# Patient Record
Sex: MALE | Race: OTHER | HISPANIC OR LATINO | URBAN - METROPOLITAN AREA
[De-identification: names, ages, dates, MRNs, and addresses within clinical notes are randomized per-mention and may not be internally consistent; named-entity substitution may affect disease eponyms.]

---

## 2023-02-21 ENCOUNTER — INPATIENT (INPATIENT)
Facility: HOSPITAL | Age: 28
LOS: 1 days | Discharge: HOME | DRG: 392 | End: 2023-02-23
Attending: INTERNAL MEDICINE | Admitting: INTERNAL MEDICINE
Payer: MEDICAID

## 2023-02-21 VITALS
RESPIRATION RATE: 18 BRPM | WEIGHT: 160.06 LBS | HEIGHT: 73 IN | HEART RATE: 96 BPM | TEMPERATURE: 100 F | OXYGEN SATURATION: 97 % | SYSTOLIC BLOOD PRESSURE: 111 MMHG | DIASTOLIC BLOOD PRESSURE: 64 MMHG

## 2023-02-21 DIAGNOSIS — A41.9 SEPSIS, UNSPECIFIED ORGANISM: ICD-10-CM

## 2023-02-21 DIAGNOSIS — Z29.9 ENCOUNTER FOR PROPHYLACTIC MEASURES, UNSPECIFIED: ICD-10-CM

## 2023-02-21 DIAGNOSIS — R65.10 SYSTEMIC INFLAMMATORY RESPONSE SYNDROME (SIRS) OF NON-INFECTIOUS ORIGIN WITHOUT ACUTE ORGAN DYSFUNCTION: ICD-10-CM

## 2023-02-21 LAB
ACETONE SERPL-MCNC: NEGATIVE — SIGNIFICANT CHANGE UP
ALBUMIN SERPL ELPH-MCNC: 3.8 G/DL — SIGNIFICANT CHANGE UP (ref 3.5–5)
ALP SERPL-CCNC: 80 U/L — SIGNIFICANT CHANGE UP (ref 40–120)
ALT FLD-CCNC: 21 U/L DA — SIGNIFICANT CHANGE UP (ref 10–60)
ANION GAP SERPL CALC-SCNC: 8 MMOL/L — SIGNIFICANT CHANGE UP (ref 5–17)
APPEARANCE UR: CLEAR — SIGNIFICANT CHANGE UP
APTT BLD: 27.1 SEC — LOW (ref 27.5–35.5)
AST SERPL-CCNC: 16 U/L — SIGNIFICANT CHANGE UP (ref 10–40)
BACTERIA # UR AUTO: ABNORMAL /HPF
BASOPHILS # BLD AUTO: 0 K/UL — SIGNIFICANT CHANGE UP (ref 0–0.2)
BASOPHILS NFR BLD AUTO: 0 % — SIGNIFICANT CHANGE UP (ref 0–2)
BILIRUB SERPL-MCNC: 1 MG/DL — SIGNIFICANT CHANGE UP (ref 0.2–1.2)
BILIRUB UR-MCNC: ABNORMAL
BUN SERPL-MCNC: 15 MG/DL — SIGNIFICANT CHANGE UP (ref 7–18)
CALCIUM SERPL-MCNC: 8.9 MG/DL — SIGNIFICANT CHANGE UP (ref 8.4–10.5)
CHLORIDE SERPL-SCNC: 110 MMOL/L — HIGH (ref 96–108)
CO2 SERPL-SCNC: 26 MMOL/L — SIGNIFICANT CHANGE UP (ref 22–31)
COLOR SPEC: YELLOW — SIGNIFICANT CHANGE UP
CREAT SERPL-MCNC: 1.11 MG/DL — SIGNIFICANT CHANGE UP (ref 0.5–1.3)
DIFF PNL FLD: ABNORMAL
EGFR: 94 ML/MIN/1.73M2 — SIGNIFICANT CHANGE UP
EOSINOPHIL # BLD AUTO: 0 K/UL — SIGNIFICANT CHANGE UP (ref 0–0.5)
EOSINOPHIL NFR BLD AUTO: 0 % — SIGNIFICANT CHANGE UP (ref 0–6)
EPI CELLS # UR: SIGNIFICANT CHANGE UP /HPF
GLUCOSE SERPL-MCNC: 125 MG/DL — HIGH (ref 70–99)
GLUCOSE UR QL: NEGATIVE — SIGNIFICANT CHANGE UP
HCT VFR BLD CALC: 48.8 % — SIGNIFICANT CHANGE UP (ref 39–50)
HGB BLD-MCNC: 16 G/DL — SIGNIFICANT CHANGE UP (ref 13–17)
INR BLD: 1.2 RATIO — HIGH (ref 0.88–1.16)
KETONES UR-MCNC: ABNORMAL
LACTATE SERPL-SCNC: 1.4 MMOL/L — SIGNIFICANT CHANGE UP (ref 0.7–2)
LEUKOCYTE ESTERASE UR-ACNC: ABNORMAL
LYMPHOCYTES # BLD AUTO: 0.58 K/UL — LOW (ref 1–3.3)
LYMPHOCYTES # BLD AUTO: 5 % — LOW (ref 13–44)
MANUAL SMEAR VERIFICATION: SIGNIFICANT CHANGE UP
MCHC RBC-ENTMCNC: 29.8 PG — SIGNIFICANT CHANGE UP (ref 27–34)
MCHC RBC-ENTMCNC: 32.8 GM/DL — SIGNIFICANT CHANGE UP (ref 32–36)
MCV RBC AUTO: 90.9 FL — SIGNIFICANT CHANGE UP (ref 80–100)
METAMYELOCYTES # FLD: 2 % — HIGH (ref 0–0)
MONOCYTES # BLD AUTO: 0.23 K/UL — SIGNIFICANT CHANGE UP (ref 0–0.9)
MONOCYTES NFR BLD AUTO: 2 % — SIGNIFICANT CHANGE UP (ref 2–14)
NEUTROPHILS # BLD AUTO: 10.48 K/UL — HIGH (ref 1.8–7.4)
NEUTROPHILS NFR BLD AUTO: 90 % — HIGH (ref 43–77)
NEUTS BAND # BLD: 1 % — SIGNIFICANT CHANGE UP (ref 0–8)
NITRITE UR-MCNC: NEGATIVE — SIGNIFICANT CHANGE UP
NRBC # BLD: 0 /100 — SIGNIFICANT CHANGE UP (ref 0–0)
PH UR: 6 — SIGNIFICANT CHANGE UP (ref 5–8)
PLAT MORPH BLD: NORMAL — SIGNIFICANT CHANGE UP
PLATELET # BLD AUTO: 192 K/UL — SIGNIFICANT CHANGE UP (ref 150–400)
PLATELET COUNT - ESTIMATE: NORMAL — SIGNIFICANT CHANGE UP
POTASSIUM SERPL-MCNC: 4.2 MMOL/L — SIGNIFICANT CHANGE UP (ref 3.5–5.3)
POTASSIUM SERPL-SCNC: 4.2 MMOL/L — SIGNIFICANT CHANGE UP (ref 3.5–5.3)
PROT SERPL-MCNC: 6.9 G/DL — SIGNIFICANT CHANGE UP (ref 6–8.3)
PROT UR-MCNC: 30 MG/DL
PROTHROM AB SERPL-ACNC: 14.3 SEC — HIGH (ref 10.5–13.4)
RAPID RVP RESULT: SIGNIFICANT CHANGE UP
RBC # BLD: 5.37 M/UL — SIGNIFICANT CHANGE UP (ref 4.2–5.8)
RBC # FLD: 11.6 % — SIGNIFICANT CHANGE UP (ref 10.3–14.5)
RBC BLD AUTO: NORMAL — SIGNIFICANT CHANGE UP
RBC CASTS # UR COMP ASSIST: ABNORMAL /HPF (ref 0–2)
SARS-COV-2 RNA SPEC QL NAA+PROBE: SIGNIFICANT CHANGE UP
SODIUM SERPL-SCNC: 144 MMOL/L — SIGNIFICANT CHANGE UP (ref 135–145)
SP GR SPEC: 1.02 — SIGNIFICANT CHANGE UP (ref 1.01–1.02)
UROBILINOGEN FLD QL: 8
WBC # BLD: 11.52 K/UL — HIGH (ref 3.8–10.5)
WBC # FLD AUTO: 11.52 K/UL — HIGH (ref 3.8–10.5)
WBC UR QL: SIGNIFICANT CHANGE UP /HPF (ref 0–5)

## 2023-02-21 PROCEDURE — 99285 EMERGENCY DEPT VISIT HI MDM: CPT

## 2023-02-21 PROCEDURE — 71045 X-RAY EXAM CHEST 1 VIEW: CPT | Mod: 26

## 2023-02-21 RX ORDER — ENOXAPARIN SODIUM 100 MG/ML
40 INJECTION SUBCUTANEOUS EVERY 24 HOURS
Refills: 0 | Status: DISCONTINUED | OUTPATIENT
Start: 2023-02-21 | End: 2023-02-23

## 2023-02-21 RX ORDER — ONDANSETRON 8 MG/1
4 TABLET, FILM COATED ORAL EVERY 8 HOURS
Refills: 0 | Status: DISCONTINUED | OUTPATIENT
Start: 2023-02-21 | End: 2023-02-23

## 2023-02-21 RX ORDER — KETOROLAC TROMETHAMINE 30 MG/ML
30 SYRINGE (ML) INJECTION ONCE
Refills: 0 | Status: DISCONTINUED | OUTPATIENT
Start: 2023-02-21 | End: 2023-02-21

## 2023-02-21 RX ORDER — LANOLIN ALCOHOL/MO/W.PET/CERES
3 CREAM (GRAM) TOPICAL AT BEDTIME
Refills: 0 | Status: DISCONTINUED | OUTPATIENT
Start: 2023-02-21 | End: 2023-02-23

## 2023-02-21 RX ORDER — SODIUM CHLORIDE 9 MG/ML
1000 INJECTION, SOLUTION INTRAVENOUS
Refills: 0 | Status: DISCONTINUED | OUTPATIENT
Start: 2023-02-21 | End: 2023-02-23

## 2023-02-21 RX ORDER — AZITHROMYCIN 500 MG/1
500 TABLET, FILM COATED ORAL ONCE
Refills: 0 | Status: COMPLETED | OUTPATIENT
Start: 2023-02-21 | End: 2023-02-21

## 2023-02-21 RX ORDER — ACETAMINOPHEN 500 MG
650 TABLET ORAL ONCE
Refills: 0 | Status: COMPLETED | OUTPATIENT
Start: 2023-02-21 | End: 2023-02-21

## 2023-02-21 RX ORDER — SODIUM CHLORIDE 9 MG/ML
2300 INJECTION INTRAMUSCULAR; INTRAVENOUS; SUBCUTANEOUS ONCE
Refills: 0 | Status: COMPLETED | OUTPATIENT
Start: 2023-02-21 | End: 2023-02-21

## 2023-02-21 RX ORDER — ONDANSETRON 8 MG/1
4 TABLET, FILM COATED ORAL ONCE
Refills: 0 | Status: COMPLETED | OUTPATIENT
Start: 2023-02-21 | End: 2023-02-21

## 2023-02-21 RX ORDER — ACETAMINOPHEN 500 MG
650 TABLET ORAL EVERY 6 HOURS
Refills: 0 | Status: DISCONTINUED | OUTPATIENT
Start: 2023-02-21 | End: 2023-02-23

## 2023-02-21 RX ORDER — CEFTRIAXONE 500 MG/1
1000 INJECTION, POWDER, FOR SOLUTION INTRAMUSCULAR; INTRAVENOUS ONCE
Refills: 0 | Status: COMPLETED | OUTPATIENT
Start: 2023-02-21 | End: 2023-02-21

## 2023-02-21 RX ADMIN — ONDANSETRON 4 MILLIGRAM(S): 8 TABLET, FILM COATED ORAL at 13:09

## 2023-02-21 RX ADMIN — Medication 650 MILLIGRAM(S): at 20:17

## 2023-02-21 RX ADMIN — Medication 650 MILLIGRAM(S): at 19:56

## 2023-02-21 RX ADMIN — CEFTRIAXONE 100 MILLIGRAM(S): 500 INJECTION, POWDER, FOR SOLUTION INTRAMUSCULAR; INTRAVENOUS at 19:56

## 2023-02-21 RX ADMIN — Medication 30 MILLIGRAM(S): at 13:11

## 2023-02-21 RX ADMIN — SODIUM CHLORIDE 2300 MILLILITER(S): 9 INJECTION INTRAMUSCULAR; INTRAVENOUS; SUBCUTANEOUS at 13:09

## 2023-02-21 RX ADMIN — AZITHROMYCIN 255 MILLIGRAM(S): 500 TABLET, FILM COATED ORAL at 19:56

## 2023-02-21 RX ADMIN — Medication 30 MILLIGRAM(S): at 20:18

## 2023-02-21 NOTE — H&P ADULT - PROBLEM SELECTOR PLAN 1
p/w subjective fevers and rigors +  HR 96, WBC11.52, Lactate negative    Likely 2/2 UTI / acute bronchitis/ PNA  CXR l CT a/p ___  s/p cefepime 2g, 2 L NS bolus  in the ED   will start rocephin 1g q24   c/w IVF @ 100 cc LR x 24h  f/u UCx and BCx  ID consulted: p/w subjective fevers and rigors +  HR 96, WBC11.52, Lactate negative    Likely 2/2 acute bronchitis vs viral gastroenteritis   CXR benign, no priors to compare; f/u official read   COVID and Flu negative; UA negative   s/p azithromycin, rocephin, 2.3 L NS bolus  in the ED   will start rocephin 1g q24   c/w IVF @ 100 cc LR x 24h  c/w zofran PRN   f/u UCx and BCx p/w subjective fevers and rigors +  HR 96, WBC11.52, Lactate negative    Likely 2/2 acute bronchitis vs PNA vs UTI  CXR benign, no priors to compare; f/u official read   COVID and Flu negative; UA negative   s/p azithromycin, rocephin, 2.3 L NS bolus  in the ED   UA negative   will start Rocephin and azithromycin    c/w supportive care  c/w IVF @ 100 cc LR x 24h  c/w zofran PRN   f/u CXR official read   f/u CT chest for abnormal RML opacity ?hilar adenopathy, pending official read   f/u CT a/p eval underlying kidney stones   f/u UCx and BCx

## 2023-02-21 NOTE — H&P ADULT - PROBLEM SELECTOR PROBLEM 1
SIRS without infection or organ dysfunction Systemic inflammatory response syndrome (SIRS) without organ dysfunction

## 2023-02-21 NOTE — ED PROVIDER NOTE - CARE PLAN
Medication History  [facility]    Patient Name: Rhys Roque 1988     Medication history has been completed by: Jeanna Maravilla CPhT    Source(s) of information: Medication list provided by [facility] of [facility]     Primary Care Physician: No primary care provider on file. Pharmacy:     Allergies as of 10/01/2021 - Fully Reviewed 09/06/2021   Allergen Reaction Noted    Oxycodone  08/01/2021    Rondec-d [chlophedianol-pseudoephedrine]  08/01/2021        Prior to Admission medications    Medication Sig Start Date End Date Taking? Authorizing Provider   HYDROcodone-acetaminophen (NORCO)  MG per tablet Take 1 tablet by mouth three times a week. Receives routinely on Dialysis Days Mon/Wed/Fri   Yes Historical Provider, MD   midodrine (PROAMATINE) 10 MG tablet Take 10 mg by mouth three times a week Takes piror to Dialysis Days and half way through dialysis Mon/Wed/Fri 9/11/21  Yes Historical Provider, MD   acetaminophen (TYLENOL) 325 MG tablet Take 650 mg by mouth every 6 hours as needed for Pain or Fever   Yes Historical Provider, MD   atorvastatin (LIPITOR) 80 MG tablet Take 80 mg by mouth nightly   Yes Historical Provider, MD   baclofen (LIORESAL) 10 MG tablet Take 10 mg by mouth daily   Yes Historical Provider, MD   carvedilol (COREG) 25 MG tablet Take 25 mg by mouth 2 times daily (with meals)   Yes Historical Provider, MD   traMADol (ULTRAM) 50 MG tablet Take 50 mg by mouth every 6 hours as needed for Pain.  Give routinely piror to treatment   Yes Historical Provider, MD   cloNIDine (CATAPRES) 0.1 MG tablet Take 0.1 mg by mouth every 4 hours as needed for High Blood Pressure   Yes Historical Provider, MD   dicyclomine (BENTYL) 20 MG tablet Take 20 mg by mouth every 6 hours   Yes Historical Provider, MD   apixaban (ELIQUIS) 2.5 MG TABS tablet Take 2.5 mg by mouth 2 times daily   Yes Historical Provider, MD   escitalopram (LEXAPRO) 10 MG tablet Take 10 mg by mouth daily   Yes Historical Provider, MD   tamsulosin (FLOMAX) 0.4 MG capsule Take 0.4 mg by mouth daily   Yes Historical Provider, MD   folic acid (FOLVITE) 1 MG tablet Take 1 mg by mouth daily   Yes Historical Provider, MD   furosemide (LASIX) 80 MG tablet Take 80 mg by mouth daily   Yes Historical Provider, MD   gabapentin (NEURONTIN) 300 MG capsule Take 300 mg by mouth 3 times daily. Yes Historical Provider, MD   hydrALAZINE (APRESOLINE) 25 MG tablet Take 25 mg by mouth daily   Yes Historical Provider, MD   HYDROcodone-acetaminophen (NORCO) 5-325 MG per tablet Take 1 tablet by mouth every 6 hours as needed for Pain. Yes Historical Provider, MD   insulin lispro (HUMALOG) 100 UNIT/ML injection vial Inject into the skin 4 times daily (with meals and nightly) Sliding Scale: If BG 0-150 = 0 units  If 151-200 = 2 units  If 201-250 = 4 units  If 251-300 = 6 units  If 301-350 = 8 units  If 351-400 = 10 units   Yes Historical Provider, MD   lactase (LACTAID) 3000 units tablet Take 1 tablet by mouth 3 times daily (with meals)   Yes Historical Provider, MD   insulin glargine (LANTUS) 100 UNIT/ML injection vial Inject 12 Units into the skin nightly    Yes Historical Provider, MD   pregabalin (LYRICA) 75 MG capsule Take 75 mg by mouth 2 times daily.    Yes Historical Provider, MD   melatonin 3 MG TABS tablet Take 3 mg by mouth nightly   Yes Historical Provider, MD   mirtazapine (REMERON) 7.5 MG tablet Take 7.5 mg by mouth nightly    Yes Historical Provider, MD   nystatin (MYCOSTATIN) POWD powder Apply topically 2 times daily Apply to groin and scrotum topically every morning and at bedtime for skin irritation   Yes Historical Provider, MD   promethazine (PHENERGAN) 12.5 MG tablet Take 12.5 mg by mouth every 6 hours as needed for Nausea   Yes Historical Provider, MD   Multiple Vitamins-Minerals (PRORENAL + D) TABS Take 1 tablet by mouth daily   Yes Historical Provider, MD   sevelamer (RENVELA) 800 MG tablet Take 1 tablet by mouth 3 times daily (with meals)   Yes Historical Provider, MD   simethicone (MYLICON) 80 MG chewable tablet Take 80 mg by mouth every 6 hours as needed for Flatulence   Yes Historical Provider, MD     Medications added or changed (ex.  new medication, dosage change, interval change, formulation change):  Norco routinely added on dialysis days  Lantus dosage change from 5 units to 12 units at HS  Midodrine routinely on dialysis days  Mirtazapine dosage change from 15 mg to 7.5 mg at HS    Medications removed from list (include reason, ex. noncompliance, medication cost, therapy complete etc.):   Cholecalciferol not on med list from nursing facility    Comments:  Medication list provided by [facility]     To my knowledge the above medication history is accurate as of 10/1/2021 9:52 AM.   Atul Dunaway CPhT   10/1/2021 9:52 AM 1 Principal Discharge DX:	Sepsis  Secondary Diagnosis:	Bacteremia

## 2023-02-21 NOTE — H&P ADULT - ATTENDING COMMENTS
27M with no PMHx, p/w fatigue, fever, and NBNB emesis x4 since 4AM today. He reports subjective fevers and rigors that woke him up from sleep, denies night sweats. Also reports throat pain worse with swallowing cold liquids that was proceeded by a throat discomfort and sinus congestion a few days earlier. Denies productive cough. He denies abdominal pain, nausea, and diarrhea and his last BM was yesterday. Denies any change in appetite stating that he eats as he normally would. He does not have urinary symptoms or shortness of breath. He has not had any sick contacts. He denies tobacco and substance use. He reports havinghte flu vaccine but not vaccinated again COVID-19. Patient denies HA, chest pain, SOB, abdominal pain, and or changes in urination and bm.    # CASE D/W MOTHER AT BEDSIDE    # FEVER, R/O PNEUMONIA VS. UTI  # INTRACTABLE N/V  # R/O NEPHROLITHIASIS  - PLACED ON ROCEPHIN + FLAGYLL, F/U BCX AND UCX  - CLEAR LIQUID DIET, ADVANCE AS TOLERATED  - IVF  - PRN ANTIEMETICS  - F/U CT CHEST/ABD/PELVIS  - RVP NEGATIVE    # GI AND DVT PPX

## 2023-02-21 NOTE — ED PROVIDER NOTE - ENMT, MLM
Airway patent, Nasal mucosa clear (+) Dry oral mucosa. (+) Pharyngeal erythema (-) Oropharyngeal exudates. Uvula is midline.

## 2023-02-21 NOTE — H&P ADULT - NSHPPHYSICALEXAM_GEN_ALL_CORE
Vital Signs Last 24 Hrs  T(C): 37.4 (21 Feb 2023 15:35), Max: 37.7 (21 Feb 2023 12:01)  T(F): 99.4 (21 Feb 2023 15:35), Max: 99.8 (21 Feb 2023 12:01)  HR: 73 (21 Feb 2023 15:35) (73 - 96)  BP: 101/58 (21 Feb 2023 15:35) (101/58 - 111/64)  BP(mean): --  RR: 18 (21 Feb 2023 15:35) (18 - 18)  SpO2: 98% (21 Feb 2023 15:35) (97% - 98%)    Parameters below as of 21 Feb 2023 12:01  Patient On (Oxygen Delivery Method): room air    GENERAL: NAD, lying in bed comfortably  HEAD:  Atraumatic, Normocephalic  EYES: EOMI, PERRLA, conjunctiva and sclera clear  ENT: Moist mucous membranes  NECK: Supple, No JVD  CHEST/LUNG: Clear to auscultation bilaterally; No rales, rhonchi, wheezing, or rubs. Unlabored respirations  HEART: Regular rate and rhythm; No murmurs, rubs, or gallops  ABDOMEN: Bowel sounds present; Soft, Nontender, Nondistended. No hepatomegally  EXTREMITIES:  2+ Peripheral Pulses, brisk capillary refill. No clubbing, cyanosis, or edema  NERVOUS SYSTEM:  Alert & Oriented X3, speech clear. No deficits   MSK: FROM all 4 extremities, full and equal strength  SKIN: No rashes or lesions

## 2023-02-21 NOTE — H&P ADULT - ASSESSMENT
27M with no PMHx, p/w fatigue, fever, and NBNB emesis x4 since 4AM today. Admitted for SIRS and further work up.

## 2023-02-21 NOTE — ED ADULT NURSE NOTE - OBJECTIVE STATEMENT
Patient came to the ED a/o x 3 ambulates c/o fever, chills/ body aches and vomiting x today. No respiratory distress noted.

## 2023-02-21 NOTE — ED PROVIDER NOTE - OBJECTIVE STATEMENT
28yo M with no PMHx presenting with 28yo M with no PMHx presenting with fatigue, fever, and NBNB emesis x4 since 4 am. He admits to an episode of rigors that woke him up from sleep. He also admits to throat pain worse with swallowing cold liquids that was proceeded by a throat "tickle" and runny nose a few days earlier. He said that he coughed up phlegm on Sunday that had a little bit of blood in it, but has not seen anything similar since. He has no cough. He denies abdominal pain, nausea, and diarrhea and his last BM was yesterday. He does not have urinary symptoms or shortness of breath. He has not had any sick contacts. He denies tobacco and substance use. He has not been vaccinated for COVID-19.

## 2023-02-21 NOTE — H&P ADULT - PROBLEM SELECTOR PLAN 2
Lovenox reports dark urine + voluntary urinary retention, now with signs of infection of unclear etiology  UA negative  will start Rocephin and azithromycin  f/u CT a/p eval underlying kidney stones  f/u UCx

## 2023-02-21 NOTE — H&P ADULT - HISTORY OF PRESENT ILLNESS
27M with no PMHx, p/w fatigue, fever, and NBNB emesis x4 since 4AM today. He reports subjective fevers and rigors that woke him up from sleep, denies night sweats. Also reports throat pain worse with swallowing cold liquids that was proceeded by a throat discomfort and sinus congestion a few days earlier. Denies productive cough. He denies abdominal pain, nausea, and diarrhea and his last BM was yesterday. Denies any change in appetite stating that he eats as he normally would. He does not have urinary symptoms or shortness of breath. He has not had any sick contacts. He denies tobacco and substance use. He reports havinghte flu vaccine but not vaccinated again COVID-19. Patient denies HA, chest pain, SOB, abdominal pain, and or changes in urination and bm.

## 2023-02-21 NOTE — ED PROVIDER NOTE - PROGRESS NOTE DETAILS
case d/w Dr. Silva Labs/CXR explained to pt & mother.  Pt again with another episode of rigors, appears to be toxic, pt with mild leukocytosis, concern for bacteremia, etiology unknown.  Will consider admission  delayed in giving Ab, initially considering viral infection.  Since with 2nd episode of rigors, will give Abx

## 2023-02-21 NOTE — ED PROVIDER NOTE - ATTENDING CONTRIBUTION TO CARE
27 y.o. male c/o rigors ~4am, fever, pt vomited x4, no hematemesis, mild runny nose, last BM earlier yest.  No abd pain, dysuria, pt's unable to tolerate any po intake,  Pt took Tylenol 500mg, last dose 6am  PE;  mild distress, hot to touch, mucosa-dry, throat -no exudate, erythema, Heart-S1S2 RR, Lungs-clear, abd-soft, NT, ND, Ext- no C/E/C, no CVAT, Neuro-no nuchal rigidity, Ox3  Pt with fever, rigors, vomiting, no abd pain, concern for viral infection, COVID.  Will get labs, give IVF, reassess

## 2023-02-21 NOTE — H&P ADULT - NSICDXPASTSURGICALHX_GEN_ALL_CORE_FT
Referred by: Elli Gilbert MD; Medical Diagnosis (from order):    Diagnosis Information      Diagnosis    719.46, 338.29 (ICD-9-CM) - M25.561, G89.29 (ICD-10-CM) - Chronic pain of right knee                Physical Therapy -  Discharge Summary    Visit:  4     SUBJECTIVE                                                                                                             No change in pain. Continued 'deep' medial joint line pain. Worse with stair negotiation, terminal knee extension      OBJECTIVE                                                                                                                       Palpation:   Comments / Details: Reduction in medial knee pain and restrictions in medial retinaculum    Special Tests:  Bounce Home Test: Right: negative  Marlys's Test: Right: negative    Comments / Details: Outcome Measures:   Lower Extremity Functional Scale: LEFS Calculated Total: 50 (0=extreme difficulty; 80=no difficulty) see flowsheet for additional documentation    TREATMENT                                                                                                                  Therapeutic Exercise:  SAQx 10 B  SLR  x 10 B  SLR with ER  x 10 B  sidelying hip abduction x 10 B  Butterfly stretch 2x30\"  Clamshells RTB at knees x 10 B  4\" forward step up x 10 B  4\" lateral step up x 10 B  Chair squats x 10      Manual Therapy:  Hib adductor stretch    Skilled input: verbal instruction/cues and tactile instruction/cues    Writer verbally educated and received verbal consent for hand placement, positioning of patient, and techniques to be performed today from patient     Home Exercise Program: SLR with ER  S/L hip abduction  Butterfly  clamshells     ASSESSMENT                                                                                                             Good range. Pain with terminal knee extension closed chain strengthening primarily with stair negotiation. Follow up with  PCP or orthopedics to determine need for imaging or further medical imaging.    Patient Education:   Results of above outlined education: Verbalizes understanding      PLAN                                                                                                                           Discharge from skilled therapy with instructions/recommendations to        GOALS                                                                                                                           Decrease pain/symptoms to 0/10    The above improvements in impairments to assist in obtaining goals listed below  Long Term Goals: to be met be end of plan of care  1. Patient will demonstrate ability to negotiate level and unlevel surfaces at variable velocities, including change of direction without increased pain or instability to return to age appropriate and community activities at prior level of function.  Status: Met    2. Patient will ascend and descend 1 flight of steps for completion of household tasks such as laundry Status: Not met       Procedures and total treatment time documented Time Entry flowsheet.   PAST SURGICAL HISTORY:  No significant past surgical history

## 2023-02-21 NOTE — ED PROVIDER NOTE - CLINICAL SUMMARY MEDICAL DECISION MAKING FREE TEXT BOX
28yo M presenting with an episode of rigors, sore throat, headache and vomiting x4 since 4am concerning for viral URI vs. gastroenteritis. Patient is not vaccinated for COVID-19. No pharyngeal exudates or lymphadenopathy noted on physical exam, so less concerned for strep/mono. Gastritis possible due to uncharacteristically late meal, but less likely due to absence of abdominal pain.    - IVF, pain/nausea control  - CBC, CMP, BCx, UCx  - COVID-19/Influenza

## 2023-02-22 DIAGNOSIS — R65.10 SYSTEMIC INFLAMMATORY RESPONSE SYNDROME (SIRS) OF NON-INFECTIOUS ORIGIN WITHOUT ACUTE ORGAN DYSFUNCTION: ICD-10-CM

## 2023-02-22 DIAGNOSIS — R39.9 UNSPECIFIED SYMPTOMS AND SIGNS INVOLVING THE GENITOURINARY SYSTEM: ICD-10-CM

## 2023-02-22 LAB
ALBUMIN SERPL ELPH-MCNC: 2.9 G/DL — LOW (ref 3.5–5)
ALP SERPL-CCNC: 57 U/L — SIGNIFICANT CHANGE UP (ref 40–120)
ALT FLD-CCNC: 15 U/L DA — SIGNIFICANT CHANGE UP (ref 10–60)
ANION GAP SERPL CALC-SCNC: 5 MMOL/L — SIGNIFICANT CHANGE UP (ref 5–17)
AST SERPL-CCNC: 12 U/L — SIGNIFICANT CHANGE UP (ref 10–40)
BASOPHILS # BLD AUTO: 0.02 K/UL — SIGNIFICANT CHANGE UP (ref 0–0.2)
BASOPHILS NFR BLD AUTO: 0.4 % — SIGNIFICANT CHANGE UP (ref 0–2)
BILIRUB SERPL-MCNC: 0.8 MG/DL — SIGNIFICANT CHANGE UP (ref 0.2–1.2)
BUN SERPL-MCNC: 11 MG/DL — SIGNIFICANT CHANGE UP (ref 7–18)
CALCIUM SERPL-MCNC: 8.5 MG/DL — SIGNIFICANT CHANGE UP (ref 8.4–10.5)
CHLORIDE SERPL-SCNC: 113 MMOL/L — HIGH (ref 96–108)
CO2 SERPL-SCNC: 26 MMOL/L — SIGNIFICANT CHANGE UP (ref 22–31)
CREAT SERPL-MCNC: 1.02 MG/DL — SIGNIFICANT CHANGE UP (ref 0.5–1.3)
CULTURE RESULTS: SIGNIFICANT CHANGE UP
EGFR: 104 ML/MIN/1.73M2 — SIGNIFICANT CHANGE UP
EOSINOPHIL # BLD AUTO: 0.03 K/UL — SIGNIFICANT CHANGE UP (ref 0–0.5)
EOSINOPHIL NFR BLD AUTO: 0.6 % — SIGNIFICANT CHANGE UP (ref 0–6)
GLUCOSE SERPL-MCNC: 97 MG/DL — SIGNIFICANT CHANGE UP (ref 70–99)
HCT VFR BLD CALC: 38.3 % — LOW (ref 39–50)
HGB BLD-MCNC: 12.8 G/DL — LOW (ref 13–17)
IMM GRANULOCYTES NFR BLD AUTO: 0.2 % — SIGNIFICANT CHANGE UP (ref 0–0.9)
LYMPHOCYTES # BLD AUTO: 1.14 K/UL — SIGNIFICANT CHANGE UP (ref 1–3.3)
LYMPHOCYTES # BLD AUTO: 22 % — SIGNIFICANT CHANGE UP (ref 13–44)
MAGNESIUM SERPL-MCNC: 2 MG/DL — SIGNIFICANT CHANGE UP (ref 1.6–2.6)
MCHC RBC-ENTMCNC: 31.1 PG — SIGNIFICANT CHANGE UP (ref 27–34)
MCHC RBC-ENTMCNC: 33.4 GM/DL — SIGNIFICANT CHANGE UP (ref 32–36)
MCV RBC AUTO: 93 FL — SIGNIFICANT CHANGE UP (ref 80–100)
MONOCYTES # BLD AUTO: 0.67 K/UL — SIGNIFICANT CHANGE UP (ref 0–0.9)
MONOCYTES NFR BLD AUTO: 12.9 % — SIGNIFICANT CHANGE UP (ref 2–14)
NEUTROPHILS # BLD AUTO: 3.32 K/UL — SIGNIFICANT CHANGE UP (ref 1.8–7.4)
NEUTROPHILS NFR BLD AUTO: 63.9 % — SIGNIFICANT CHANGE UP (ref 43–77)
NRBC # BLD: 0 /100 WBCS — SIGNIFICANT CHANGE UP (ref 0–0)
PHOSPHATE SERPL-MCNC: 2.9 MG/DL — SIGNIFICANT CHANGE UP (ref 2.5–4.5)
PLATELET # BLD AUTO: 124 K/UL — LOW (ref 150–400)
POTASSIUM SERPL-MCNC: 4.6 MMOL/L — SIGNIFICANT CHANGE UP (ref 3.5–5.3)
POTASSIUM SERPL-SCNC: 4.6 MMOL/L — SIGNIFICANT CHANGE UP (ref 3.5–5.3)
PROT SERPL-MCNC: 5.2 G/DL — LOW (ref 6–8.3)
RBC # BLD: 4.12 M/UL — LOW (ref 4.2–5.8)
RBC # FLD: 11.6 % — SIGNIFICANT CHANGE UP (ref 10.3–14.5)
SODIUM SERPL-SCNC: 144 MMOL/L — SIGNIFICANT CHANGE UP (ref 135–145)
SPECIMEN SOURCE: SIGNIFICANT CHANGE UP
WBC # BLD: 5.19 K/UL — SIGNIFICANT CHANGE UP (ref 3.8–10.5)
WBC # FLD AUTO: 5.19 K/UL — SIGNIFICANT CHANGE UP (ref 3.8–10.5)

## 2023-02-22 PROCEDURE — 74176 CT ABD & PELVIS W/O CONTRAST: CPT | Mod: 26

## 2023-02-22 PROCEDURE — 71250 CT THORAX DX C-: CPT | Mod: 26

## 2023-02-22 RX ORDER — CEFTRIAXONE 500 MG/1
1000 INJECTION, POWDER, FOR SOLUTION INTRAMUSCULAR; INTRAVENOUS EVERY 24 HOURS
Refills: 0 | Status: DISCONTINUED | OUTPATIENT
Start: 2023-02-22 | End: 2023-02-23

## 2023-02-22 RX ORDER — AZITHROMYCIN 500 MG/1
500 TABLET, FILM COATED ORAL EVERY 24 HOURS
Refills: 0 | Status: DISCONTINUED | OUTPATIENT
Start: 2023-02-22 | End: 2023-02-23

## 2023-02-22 RX ADMIN — AZITHROMYCIN 255 MILLIGRAM(S): 500 TABLET, FILM COATED ORAL at 18:31

## 2023-02-22 RX ADMIN — ENOXAPARIN SODIUM 40 MILLIGRAM(S): 100 INJECTION SUBCUTANEOUS at 05:40

## 2023-02-22 RX ADMIN — Medication 650 MILLIGRAM(S): at 07:21

## 2023-02-22 RX ADMIN — CEFTRIAXONE 100 MILLIGRAM(S): 500 INJECTION, POWDER, FOR SOLUTION INTRAMUSCULAR; INTRAVENOUS at 18:31

## 2023-02-22 RX ADMIN — SODIUM CHLORIDE 100 MILLILITER(S): 9 INJECTION, SOLUTION INTRAVENOUS at 05:40

## 2023-02-22 RX ADMIN — Medication 650 MILLIGRAM(S): at 08:21

## 2023-02-22 NOTE — PROGRESS NOTE ADULT - SUBJECTIVE AND OBJECTIVE BOX
Patient is a 27y old  Male who presents with a chief complaint of Weakness, Fevers (21 Feb 2023 23:10)    PATIENT IS SEEN AND EXAMINED IN MEDICAL FLOOR.  LUBAT [    ]    REA [   ]      GT [   ]    ALLERGIES:  No Known Allergies      Daily Height in cm: 185.42 (21 Feb 2023 12:01)    Daily     VITALS:    Vital Signs Last 24 Hrs  T(C): 36.9 (22 Feb 2023 07:30), Max: 37.9 (22 Feb 2023 04:36)  T(F): 98.5 (22 Feb 2023 07:30), Max: 100.3 (22 Feb 2023 04:36)  HR: 60 (22 Feb 2023 07:30) (60 - 96)  BP: 101/62 (22 Feb 2023 07:30) (101/58 - 111/64)  BP(mean): --  RR: 17 (22 Feb 2023 07:30) (17 - 18)  SpO2: 99% (22 Feb 2023 07:30) (97% - 99%)    Parameters below as of 22 Feb 2023 07:30  Patient On (Oxygen Delivery Method): room air        LABS:    CBC Full  -  ( 22 Feb 2023 05:55 )  WBC Count : 5.19 K/uL  RBC Count : 4.12 M/uL  Hemoglobin : 12.8 g/dL  Hematocrit : 38.3 %  Platelet Count - Automated : 124 K/uL  Mean Cell Volume : 93.0 fl  Mean Cell Hemoglobin : 31.1 pg  Mean Cell Hemoglobin Concentration : 33.4 gm/dL  Auto Neutrophil # : 3.32 K/uL  Auto Lymphocyte # : 1.14 K/uL  Auto Monocyte # : 0.67 K/uL  Auto Eosinophil # : 0.03 K/uL  Auto Basophil # : 0.02 K/uL  Auto Neutrophil % : 63.9 %  Auto Lymphocyte % : 22.0 %  Auto Monocyte % : 12.9 %  Auto Eosinophil % : 0.6 %  Auto Basophil % : 0.4 %    PT/INR - ( 21 Feb 2023 13:10 )   PT: 14.3 sec;   INR: 1.20 ratio         PTT - ( 21 Feb 2023 13:10 )  PTT:27.1 sec  02-22    144  |  113<H>  |  11  ----------------------------<  97  4.6   |  26  |  1.02    Ca    8.5      22 Feb 2023 05:55  Phos  2.9     02-22  Mg     2.0     02-22    TPro  5.2<L>  /  Alb  2.9<L>  /  TBili  0.8  /  DBili  x   /  AST  12  /  ALT  15  /  AlkPhos  57  02-22    CAPILLARY BLOOD GLUCOSE            LIVER FUNCTIONS - ( 22 Feb 2023 05:55 )  Alb: 2.9 g/dL / Pro: 5.2 g/dL / ALK PHOS: 57 U/L / ALT: 15 U/L DA / AST: 12 U/L / GGT: x           Creatinine Trend: 1.02<--, 1.11<--  I&O's Summary              MEDICATIONS:    MEDICATIONS  (STANDING):  azithromycin  IVPB 500 milliGRAM(s) IV Intermittent every 24 hours  cefTRIAXone   IVPB 1000 milliGRAM(s) IV Intermittent every 24 hours  enoxaparin Injectable 40 milliGRAM(s) SubCutaneous every 24 hours  lactated ringers. 1000 milliLiter(s) (100 mL/Hr) IV Continuous <Continuous>      MEDICATIONS  (PRN):  acetaminophen     Tablet .. 650 milliGRAM(s) Oral every 6 hours PRN Temp greater or equal to 38C (100.4F), Mild Pain (1 - 3)  aluminum hydroxide/magnesium hydroxide/simethicone Suspension 30 milliLiter(s) Oral every 4 hours PRN Dyspepsia  melatonin 3 milliGRAM(s) Oral at bedtime PRN Insomnia  ondansetron Injectable 4 milliGRAM(s) IV Push every 8 hours PRN Nausea and/or Vomiting      REVIEW OF SYSTEMS:                           ALL ROS DONE [ X   ]    CONSTITUTIONAL:  LETHARGIC [   ], FEVER [   ], UNRESPONSIVE [   ]  CVS:  CP  [   ], SOB, [   ], PALPITATIONS [   ], DIZZYNESS [   ]  RS: COUGH [   ], SPUTUM [   ]  GI: ABDOMINAL PAIN [   ], NAUSEA [   ], VOMITINGS [   ], DIARRHEA [   ], CONSTIPATION [   ]  :  DYSURIA [   ], NOCTURIA [   ], INCREASED FREQUENCY [   ], DRIBLING [   ],  SKELETAL: PAINFUL JOINTS [   ], SWOLLEN JOINTS [   ], NECK ACHE [   ], LOW BACK ACHE [   ],  SKIN : ULCERS [   ], RASH [   ], ITCHING [   ]  CNS: HEAD ACHE [   ], DOUBLE VISION [   ], BLURRED VISION [   ], AMS / CONFUSION [   ], SEIZURES [   ], WEAKNESS [   ],TINGLING / NUMBNESS [   ]    PHYSICAL EXAMINATION:  GENERAL APPEARANCE: NO DISTRESS  HEENT:  NO PALLOR, NO  JVD,  NO   NODES, NECK SUPPLE  CVS: S1 +, S2 +,   RS: AEEB,  OCCASIONAL  RALES +,   NO RONCHI  ABD: SOFT, NT, NO, BS +  EXT: NO PE  SKIN: WARM,   SKELETAL:  ROM ACCEPTABLE  CNS:  AAO X    ,   DEFICITS    RADIOLOGY :      ASSESSMENT :     Sepsis    No pertinent past medical history    No significant past surgical history        PLAN:  HPI:  27M with no PMHx, p/w fatigue, fever, and NBNB emesis x4 since 4AM today. He reports subjective fevers and rigors that woke him up from sleep, denies night sweats. Also reports throat pain worse with swallowing cold liquids that was proceeded by a throat discomfort and sinus congestion a few days earlier. Denies productive cough. He denies abdominal pain, nausea, and diarrhea and his last BM was yesterday. Denies any change in appetite stating that he eats as he normally would. He does not have urinary symptoms or shortness of breath. He has not had any sick contacts. He denies tobacco and substance use. He reports havinghte flu vaccine but not vaccinated again COVID-19. Patient denies HA, chest pain, SOB, abdominal pain, and or changes in urination and bm. (21 Feb 2023 23:10)    # CASE D/W MOTHER AT BEDSIDE    # FEVER, R/O PNEUMONIA VS. UTI  # INTRACTABLE N/V  # R/O NEPHROLITHIASIS  - PLACED ON ROCEPHIN + FLAGYLL, F/U BCX AND UCX  - CLEAR LIQUID DIET, ADVANCE AS TOLERATED  - IVF  - PRN ANTIEMETICS  - F/U CT CHEST/ABD/PELVIS  - RVP NEGATIVE    # GI AND DVT PPX .       Patient is a 27y old  Male who presents with a chief complaint of Weakness, Fevers (21 Feb 2023 23:10)    PATIENT IS SEEN AND EXAMINED IN MEDICAL FLOOR. PATIENT DENIES N/V/C/D/ABDOMINAL PAIN. HE REPORTS MYALGIAS. HE REPORTS OCCASIONAL COUGH WITH SPUTUM PRODUCTION.    ALLERGIES:  No Known Allergies      Daily Height in cm: 185.42 (21 Feb 2023 12:01)    Daily     VITALS:    Vital Signs Last 24 Hrs  T(C): 36.9 (22 Feb 2023 07:30), Max: 37.9 (22 Feb 2023 04:36)  T(F): 98.5 (22 Feb 2023 07:30), Max: 100.3 (22 Feb 2023 04:36)  HR: 60 (22 Feb 2023 07:30) (60 - 96)  BP: 101/62 (22 Feb 2023 07:30) (101/58 - 111/64)  BP(mean): --  RR: 17 (22 Feb 2023 07:30) (17 - 18)  SpO2: 99% (22 Feb 2023 07:30) (97% - 99%)    Parameters below as of 22 Feb 2023 07:30  Patient On (Oxygen Delivery Method): room air        LABS:    CBC Full  -  ( 22 Feb 2023 05:55 )  WBC Count : 5.19 K/uL  RBC Count : 4.12 M/uL  Hemoglobin : 12.8 g/dL  Hematocrit : 38.3 %  Platelet Count - Automated : 124 K/uL  Mean Cell Volume : 93.0 fl  Mean Cell Hemoglobin : 31.1 pg  Mean Cell Hemoglobin Concentration : 33.4 gm/dL  Auto Neutrophil # : 3.32 K/uL  Auto Lymphocyte # : 1.14 K/uL  Auto Monocyte # : 0.67 K/uL  Auto Eosinophil # : 0.03 K/uL  Auto Basophil # : 0.02 K/uL  Auto Neutrophil % : 63.9 %  Auto Lymphocyte % : 22.0 %  Auto Monocyte % : 12.9 %  Auto Eosinophil % : 0.6 %  Auto Basophil % : 0.4 %    PT/INR - ( 21 Feb 2023 13:10 )   PT: 14.3 sec;   INR: 1.20 ratio         PTT - ( 21 Feb 2023 13:10 )  PTT:27.1 sec  02-22    144  |  113<H>  |  11  ----------------------------<  97  4.6   |  26  |  1.02    Ca    8.5      22 Feb 2023 05:55  Phos  2.9     02-22  Mg     2.0     02-22    TPro  5.2<L>  /  Alb  2.9<L>  /  TBili  0.8  /  DBili  x   /  AST  12  /  ALT  15  /  AlkPhos  57  02-22    CAPILLARY BLOOD GLUCOSE            LIVER FUNCTIONS - ( 22 Feb 2023 05:55 )  Alb: 2.9 g/dL / Pro: 5.2 g/dL / ALK PHOS: 57 U/L / ALT: 15 U/L DA / AST: 12 U/L / GGT: x           Creatinine Trend: 1.02<--, 1.11<--  I&O's Summary              MEDICATIONS:    MEDICATIONS  (STANDING):  azithromycin  IVPB 500 milliGRAM(s) IV Intermittent every 24 hours  cefTRIAXone   IVPB 1000 milliGRAM(s) IV Intermittent every 24 hours  enoxaparin Injectable 40 milliGRAM(s) SubCutaneous every 24 hours  lactated ringers. 1000 milliLiter(s) (100 mL/Hr) IV Continuous <Continuous>      MEDICATIONS  (PRN):  acetaminophen     Tablet .. 650 milliGRAM(s) Oral every 6 hours PRN Temp greater or equal to 38C (100.4F), Mild Pain (1 - 3)  aluminum hydroxide/magnesium hydroxide/simethicone Suspension 30 milliLiter(s) Oral every 4 hours PRN Dyspepsia  melatonin 3 milliGRAM(s) Oral at bedtime PRN Insomnia  ondansetron Injectable 4 milliGRAM(s) IV Push every 8 hours PRN Nausea and/or Vomiting      REVIEW OF SYSTEMS:                           ALL ROS DONE [ X   ]    CONSTITUTIONAL:  LETHARGIC [   ], FEVER [   ], UNRESPONSIVE [   ]  CVS:  CP  [   ], SOB, [   ], PALPITATIONS [   ], DIZZYNESS [   ]  RS: COUGH [   ], SPUTUM [   ]  GI: ABDOMINAL PAIN [   ], NAUSEA [   ], VOMITINGS [   ], DIARRHEA [   ], CONSTIPATION [   ]  :  DYSURIA [   ], NOCTURIA [   ], INCREASED FREQUENCY [   ], DRIBLING [   ],  SKELETAL: PAINFUL JOINTS [   ], SWOLLEN JOINTS [   ], NECK ACHE [   ], LOW BACK ACHE [   ],  SKIN : ULCERS [   ], RASH [   ], ITCHING [   ]  CNS: HEAD ACHE [   ], DOUBLE VISION [   ], BLURRED VISION [   ], AMS / CONFUSION [   ], SEIZURES [   ], WEAKNESS [   ],TINGLING / NUMBNESS [   ]    PHYSICAL EXAMINATION:  GENERAL APPEARANCE: NO DISTRESS  HEENT:  NO PALLOR, NO  JVD,  NO   NODES, NECK SUPPLE  CVS: S1 +, S2 +,   RS: AEEB,  OCCASIONAL  RALES +,   NO RONCHI  ABD: SOFT, NT, NO, BS +  EXT: NO PE  SKIN: WARM,   SKELETAL:  ROM ACCEPTABLE  CNS:  AAO X 3    RADIOLOGY :    RADIOLOGY AND READINGS REVIEWED    ASSESSMENT :     Sepsis    No pertinent past medical history    No significant past surgical history        PLAN:  HPI:  27M with no PMHx, p/w fatigue, fever, and NBNB emesis x4 since 4AM today. He reports subjective fevers and rigors that woke him up from sleep, denies night sweats. Also reports throat pain worse with swallowing cold liquids that was proceeded by a throat discomfort and sinus congestion a few days earlier. Denies productive cough. He denies abdominal pain, nausea, and diarrhea and his last BM was yesterday. Denies any change in appetite stating that he eats as he normally would. He does not have urinary symptoms or shortness of breath. He has not had any sick contacts. He denies tobacco and substance use. He reports havinghte flu vaccine but not vaccinated again COVID-19. Patient denies HA, chest pain, SOB, abdominal pain, and or changes in urination and bm. (21 Feb 2023 23:10)    # CASE D/W MOTHER AT BEDSIDE    # FEVER - ? UTI, R/O PNEUMONIA  # INTRACTABLE N/V  # R/O NEPHROLITHIASIS  - PLACED ON ROCEPHIN, F/U BCX AND UCX  - TOLERATING DIET  - IVF  - PRN ANTIEMETICS  - NOTED CT CHEST/ABD/PELVIS  - RVP NEGATIVE]  - ID CONSULT    # NONOBSTRUCTIVE NEPHROLITHIASIS  - D/W PATIENT TO F/U WITH OUTPATIENT UROLOGIST    # GI AND DVT PPX .

## 2023-02-22 NOTE — PROGRESS NOTE ADULT - SUBJECTIVE AND OBJECTIVE BOX
Patient is a 27y old  Male who presents with a chief complaint of Weakness, Fevers (2023 10:32)      INTERVAL HPI/OVERNIGHT EVENTS: no overnight events    I&O's Summary    Vital Signs Last 24 Hrs  T(C): 36.6 (2023 11:10), Max: 37.9 (2023 04:36)  T(F): 97.9 (2023 11:10), Max: 100.3 (2023 04:36)  HR: 59 (2023 11:10) (59 - 89)  BP: 105/66 (2023 11:10) (101/58 - 105/66)  BP(mean): --  RR: 18 (2023 11:10) (17 - 18)  SpO2: 98% (2023 11:10) (98% - 99%)    Parameters below as of 2023 11:10  Patient On (Oxygen Delivery Method): room air      PAST MEDICAL & SURGICAL HISTORY:  No pertinent past medical history      No significant past surgical history          SOCIAL HISTORY  Alcohol:  Tobacco:  Illicit substance use:      FAMILY HISTORY:      LABS:                        12.8   5.19  )-----------( 124      ( 2023 05:55 )             38.3     02-22    144  |  113<H>  |  11  ----------------------------<  97  4.6   |  26  |  1.02    Ca    8.5      2023 05:55  Phos  2.9     -  Mg     2.0     -    TPro  5.2<L>  /  Alb  2.9<L>  /  TBili  0.8  /  DBili  x   /  AST  12  /  ALT  15  /  AlkPhos  57  02-22    PT/INR - ( 2023 13:10 )   PT: 14.3 sec;   INR: 1.20 ratio         PTT - ( 2023 13:10 )  PTT:27.1 sec  Urinalysis Basic - ( 2023 16:20 )    Color: Yellow / Appearance: Clear / S.020 / pH: x  Gluc: x / Ketone: Trace  / Bili: Small / Urobili: 8   Blood: x / Protein: 30 mg/dL / Nitrite: Negative   Leuk Esterase: Trace / RBC: 2-5 /HPF / WBC 3-5 /HPF   Sq Epi: x / Non Sq Epi: Few /HPF / Bacteria: Moderate /HPF      CAPILLARY BLOOD GLUCOSE    Urinalysis Basic - ( 2023 16:20 )    Color: Yellow / Appearance: Clear / S.020 / pH: x  Gluc: x / Ketone: Trace  / Bili: Small / Urobili: 8   Blood: x / Protein: 30 mg/dL / Nitrite: Negative   Leuk Esterase: Trace / RBC: 2-5 /HPF / WBC 3-5 /HPF   Sq Epi: x / Non Sq Epi: Few /HPF / Bacteria: Moderate /HPF        MEDICATIONS  (STANDING):  azithromycin  IVPB 500 milliGRAM(s) IV Intermittent every 24 hours  cefTRIAXone   IVPB 1000 milliGRAM(s) IV Intermittent every 24 hours  enoxaparin Injectable 40 milliGRAM(s) SubCutaneous every 24 hours  lactated ringers. 1000 milliLiter(s) (100 mL/Hr) IV Continuous <Continuous>    MEDICATIONS  (PRN):  acetaminophen     Tablet .. 650 milliGRAM(s) Oral every 6 hours PRN Temp greater or equal to 38C (100.4F), Mild Pain (1 - 3)  aluminum hydroxide/magnesium hydroxide/simethicone Suspension 30 milliLiter(s) Oral every 4 hours PRN Dyspepsia  melatonin 3 milliGRAM(s) Oral at bedtime PRN Insomnia  ondansetron Injectable 4 milliGRAM(s) IV Push every 8 hours PRN Nausea and/or Vomiting      REVIEW OF SYSTEMS:  CONSTITUTIONAL: No fever  EYES: No eye pain, visual disturbances  ENMT:  No sinus or throat pain  NECK: No pain or stiffness  RESPIRATORY: No cough, wheezing, chills . No shortness of breath  CARDIOVASCULAR: No chest pain, palpitations,  GASTROINTESTINAL: No abdominal pain. No nausea, vomiting, ; No diarrhea   GENITOURINARY: No dysuria, frequency, hematuria  NEUROLOGICAL: No headaches  SKIN: No rashes  MUSCULOSKELETAL: No joint pain     RADIOLOGY & ADDITIONAL TESTS:  ACC: [id number] EXAM:  XR CHEST PORTABLE IMMED 1V   ORDERED BY: DARRIN GREENFIELD     PROCEDURE DATE:  2023          INTERPRETATION:  Clinical information: Fever and rigors.    TECHNIQUE: Frontal view of the chest.    COMPARISON: None available.    FINDINGS: The lungs are clear. The cardiomediastinal silhouette is   normal. The visualized osseous structures are unremarkable.    IMPRESSION: Clear lungs.    --- End of Report ---    LAMONT SCHAEFER MD; Attending Radiologist  This document has been electronically signed. 2023  7:48AM    ACC: [id number] EXAM:  CT CHEST   ORDERED BY: VALARIE NOBLE     ACC: [id number] EXAM:  CT ABDOMEN AND PELVIS   ORDERED BY: VALAIRE NOBLE     PROCEDURE DATE:  2023          INTERPRETATION:  CLINICAL INFORMATION: Sepsis    COMPARISON:None.    CONTRAST/COMPLICATIONS:  IV Contrast: NONE  Oral Contrast: NONE  Complications: None reported at time of study completion    PROCEDURE:  CT of the Chest, Abdomen and Pelvis was performed.  Sagittal and coronal reformats were performed.    FINDINGS:    Please note that evaluation of the chest/abdominal organs and vascular   structures is limited by lack of intravenous contrast.    CHEST:  LUNGS AND LARGE AIRWAYS: Patent central airways. Clear lungs.  PLEURA: No pleural effusion.  VESSELS:Within normal limits.  HEART: Heart size is normal. No pericardial effusion.  MEDIASTINUM AND ADRIANA: No lymphadenopathy.  CHEST WALL AND LOWER NECK: Within normal limits.    ABDOMEN AND PELVIS:  LIVER: Within normal limits.  BILE DUCTS: Normal caliber.  GALLBLADDER: Within normal limits.  SPLEEN: Within normal limits.  PANCREAS: Within normal limits.  ADRENALS: Within normal limits.  KIDNEYS/URETERS: Bilateral punctate nonobstructive intrarenal calculi   measuring up to 1 mm. No hydronephrosis or ureteral calculi.    BLADDER: Within normal limits.  REPRODUCTIVE ORGANS: Prostate within normal limits.    BOWEL: No bowel obstruction. Appendix is within normal limits.  PERITONEUM: Trace pelvic fluid.  VESSELS: Within normal limits.  RETROPERITONEUM/LYMPH NODES: No lymphadenopathy.  ABDOMINAL WALL: Within normal limits.  BONES: Within normal limits.    IMPRESSION:  Clear lungs.  Bilateral nonobstructive nephrolithiasis.  No hydronephrosis or ureteral calculi.        --- End of Report ---      HAYDEE BOWEN MD; Attending Radiologist  This document has been electronically signed. 2023  9:53AM  Imaging Personally Reviewed:  [x ] YES  [ ] NO    Consultant(s) Notes Reviewed:  [x ] YES  [ ] NO    PHYSICAL EXAM:  GENERAL: NAD  HEAD:  Atraumatic, Normocephalic  EYES: conjunctiva and sclera clear  ENMT: Moist mucous membranes  NECK: Supple,  NERVOUS SYSTEM:  Alert & Oriented X3, Good concentration  CHEST/LUNG: diminished bilaterally;  crackles  HEART: Regular rate and rhythm  ABDOMEN: Soft, Nontender, Nondistended; Bowel sounds present  EXTREMITIES:  2+ Peripheral Pulses  SKIN: No rashes     Care Collaborated Discussed with Consultants/Other Providers [ x] YES  [ ] NO

## 2023-02-23 VITALS
DIASTOLIC BLOOD PRESSURE: 60 MMHG | TEMPERATURE: 98 F | RESPIRATION RATE: 18 BRPM | OXYGEN SATURATION: 100 % | HEART RATE: 56 BPM | SYSTOLIC BLOOD PRESSURE: 110 MMHG

## 2023-02-23 LAB
ALBUMIN SERPL ELPH-MCNC: 2.9 G/DL — LOW (ref 3.5–5)
ALP SERPL-CCNC: 52 U/L — SIGNIFICANT CHANGE UP (ref 40–120)
ALT FLD-CCNC: 18 U/L DA — SIGNIFICANT CHANGE UP (ref 10–60)
ANION GAP SERPL CALC-SCNC: 3 MMOL/L — LOW (ref 5–17)
AST SERPL-CCNC: 14 U/L — SIGNIFICANT CHANGE UP (ref 10–40)
BILIRUB SERPL-MCNC: 0.4 MG/DL — SIGNIFICANT CHANGE UP (ref 0.2–1.2)
BUN SERPL-MCNC: 10 MG/DL — SIGNIFICANT CHANGE UP (ref 7–18)
CALCIUM SERPL-MCNC: 9 MG/DL — SIGNIFICANT CHANGE UP (ref 8.4–10.5)
CHLORIDE SERPL-SCNC: 112 MMOL/L — HIGH (ref 96–108)
CO2 SERPL-SCNC: 26 MMOL/L — SIGNIFICANT CHANGE UP (ref 22–31)
CREAT SERPL-MCNC: 0.79 MG/DL — SIGNIFICANT CHANGE UP (ref 0.5–1.3)
EGFR: 126 ML/MIN/1.73M2 — SIGNIFICANT CHANGE UP
GLUCOSE SERPL-MCNC: 81 MG/DL — SIGNIFICANT CHANGE UP (ref 70–99)
HCT VFR BLD CALC: 39.8 % — SIGNIFICANT CHANGE UP (ref 39–50)
HGB BLD-MCNC: 13.4 G/DL — SIGNIFICANT CHANGE UP (ref 13–17)
MAGNESIUM SERPL-MCNC: 2 MG/DL — SIGNIFICANT CHANGE UP (ref 1.6–2.6)
MCHC RBC-ENTMCNC: 31 PG — SIGNIFICANT CHANGE UP (ref 27–34)
MCHC RBC-ENTMCNC: 33.7 GM/DL — SIGNIFICANT CHANGE UP (ref 32–36)
MCV RBC AUTO: 92.1 FL — SIGNIFICANT CHANGE UP (ref 80–100)
MRSA PCR RESULT.: SIGNIFICANT CHANGE UP
NRBC # BLD: 0 /100 WBCS — SIGNIFICANT CHANGE UP (ref 0–0)
PHOSPHATE SERPL-MCNC: 3.3 MG/DL — SIGNIFICANT CHANGE UP (ref 2.5–4.5)
PLATELET # BLD AUTO: 134 K/UL — LOW (ref 150–400)
POTASSIUM SERPL-MCNC: 4.5 MMOL/L — SIGNIFICANT CHANGE UP (ref 3.5–5.3)
POTASSIUM SERPL-SCNC: 4.5 MMOL/L — SIGNIFICANT CHANGE UP (ref 3.5–5.3)
PROT SERPL-MCNC: 5.7 G/DL — LOW (ref 6–8.3)
RBC # BLD: 4.32 M/UL — SIGNIFICANT CHANGE UP (ref 4.2–5.8)
RBC # FLD: 11.7 % — SIGNIFICANT CHANGE UP (ref 10.3–14.5)
S AUREUS DNA NOSE QL NAA+PROBE: DETECTED
SODIUM SERPL-SCNC: 141 MMOL/L — SIGNIFICANT CHANGE UP (ref 135–145)
WBC # BLD: 5.52 K/UL — SIGNIFICANT CHANGE UP (ref 3.8–10.5)
WBC # FLD AUTO: 5.52 K/UL — SIGNIFICANT CHANGE UP (ref 3.8–10.5)

## 2023-02-23 PROCEDURE — 87040 BLOOD CULTURE FOR BACTERIA: CPT

## 2023-02-23 PROCEDURE — 36415 COLL VENOUS BLD VENIPUNCTURE: CPT

## 2023-02-23 PROCEDURE — 81001 URINALYSIS AUTO W/SCOPE: CPT

## 2023-02-23 PROCEDURE — 85027 COMPLETE CBC AUTOMATED: CPT

## 2023-02-23 PROCEDURE — 82009 KETONE BODYS QUAL: CPT

## 2023-02-23 PROCEDURE — 96375 TX/PRO/DX INJ NEW DRUG ADDON: CPT

## 2023-02-23 PROCEDURE — 85730 THROMBOPLASTIN TIME PARTIAL: CPT

## 2023-02-23 PROCEDURE — 83735 ASSAY OF MAGNESIUM: CPT

## 2023-02-23 PROCEDURE — 87086 URINE CULTURE/COLONY COUNT: CPT

## 2023-02-23 PROCEDURE — 87640 STAPH A DNA AMP PROBE: CPT

## 2023-02-23 PROCEDURE — 85610 PROTHROMBIN TIME: CPT

## 2023-02-23 PROCEDURE — 93005 ELECTROCARDIOGRAM TRACING: CPT

## 2023-02-23 PROCEDURE — 0225U NFCT DS DNA&RNA 21 SARSCOV2: CPT

## 2023-02-23 PROCEDURE — 84100 ASSAY OF PHOSPHORUS: CPT

## 2023-02-23 PROCEDURE — 80053 COMPREHEN METABOLIC PANEL: CPT

## 2023-02-23 PROCEDURE — 71045 X-RAY EXAM CHEST 1 VIEW: CPT

## 2023-02-23 PROCEDURE — 99285 EMERGENCY DEPT VISIT HI MDM: CPT

## 2023-02-23 PROCEDURE — 71250 CT THORAX DX C-: CPT

## 2023-02-23 PROCEDURE — 96374 THER/PROPH/DIAG INJ IV PUSH: CPT

## 2023-02-23 PROCEDURE — 85025 COMPLETE CBC W/AUTO DIFF WBC: CPT

## 2023-02-23 PROCEDURE — 74176 CT ABD & PELVIS W/O CONTRAST: CPT

## 2023-02-23 PROCEDURE — 87641 MR-STAPH DNA AMP PROBE: CPT

## 2023-02-23 PROCEDURE — 83605 ASSAY OF LACTIC ACID: CPT

## 2023-02-23 RX ORDER — CEFUROXIME AXETIL 250 MG
1 TABLET ORAL
Qty: 10 | Refills: 0
Start: 2023-02-23 | End: 2023-02-27

## 2023-02-23 RX ADMIN — ENOXAPARIN SODIUM 40 MILLIGRAM(S): 100 INJECTION SUBCUTANEOUS at 05:31

## 2023-02-23 NOTE — CONSULT NOTE ADULT - SUBJECTIVE AND OBJECTIVE BOX
HPI:  27M with no PMHx, p/w fatigue, fever, and NBNB emesis x4 since 4AM today. He reports subjective fevers and rigors that woke him up from sleep, denies night sweats. Also reports throat pain worse with swallowing cold liquids that was proceeded by a throat discomfort and sinus congestion a few days earlier. Denies productive cough. He denies abdominal pain, nausea, and diarrhea and his last BM was yesterday. Denies any change in appetite stating that he eats as he normally would. He does not have urinary symptoms or shortness of breath. He has not had any sick contacts. He denies tobacco and substance use. He reports havinghte flu vaccine but not vaccinated again COVID-19. Patient denies HA, chest pain, SOB, abdominal pain, and or changes in urination and bm. (21 Feb 2023 23:10)      PAST MEDICAL & SURGICAL HISTORY:  No pertinent past medical history      No significant past surgical history          No Known Allergies      Meds:  acetaminophen     Tablet .. 650 milliGRAM(s) Oral every 6 hours PRN  aluminum hydroxide/magnesium hydroxide/simethicone Suspension 30 milliLiter(s) Oral every 4 hours PRN  azithromycin  IVPB 500 milliGRAM(s) IV Intermittent every 24 hours  cefTRIAXone   IVPB 1000 milliGRAM(s) IV Intermittent every 24 hours  enoxaparin Injectable 40 milliGRAM(s) SubCutaneous every 24 hours  lactated ringers. 1000 milliLiter(s) IV Continuous <Continuous>  melatonin 3 milliGRAM(s) Oral at bedtime PRN  ondansetron Injectable 4 milliGRAM(s) IV Push every 8 hours PRN      SOCIAL HISTORY:  Smoker:  YES / NO        PACK YEARS:                         WHEN QUIT?  ETOH use:  YES / NO               FREQUENCY / QUANTITY:  Ilicit Drug use:  YES / NO  Occupation:  Assisted device use (Cane / Walker):  Live with:    FAMILY HISTORY:      VITALS:  Vital Signs Last 24 Hrs  T(C): 36.4 (23 Feb 2023 13:00), Max: 36.4 (22 Feb 2023 20:30)  T(F): 97.5 (23 Feb 2023 13:00), Max: 97.6 (22 Feb 2023 20:30)  HR: 56 (23 Feb 2023 13:00) (56 - 68)  BP: 110/60 (23 Feb 2023 13:00) (108/58 - 130/61)  BP(mean): --  RR: 18 (23 Feb 2023 13:00) (16 - 18)  SpO2: 100% (23 Feb 2023 13:00) (98% - 100%)    Parameters below as of 23 Feb 2023 13:00  Patient On (Oxygen Delivery Method): room air        LABS/DIAGNOSTIC TESTS:                          13.4   5.52  )-----------( 134      ( 23 Feb 2023 07:09 )             39.8     WBC Count: 5.52 K/uL (02-23 @ 07:09)  WBC Count: 5.19 K/uL (02-22 @ 05:55)  WBC Count: 11.52 K/uL (02-21 @ 13:10)      02-23    141  |  112<H>  |  10  ----------------------------<  81  4.5   |  26  |  0.79    Ca    9.0      23 Feb 2023 07:09  Phos  3.3     02-23  Mg     2.0     02-23    TPro  5.7<L>  /  Alb  2.9<L>  /  TBili  0.4  /  DBili  x   /  AST  14  /  ALT  18  /  AlkPhos  52  02-23          LIVER FUNCTIONS - ( 23 Feb 2023 07:09 )  Alb: 2.9 g/dL / Pro: 5.7 g/dL / ALK PHOS: 52 U/L / ALT: 18 U/L DA / AST: 14 U/L / GGT: x                 LACTATE:    ABG -     CULTURES:   .Blood Blood-Peripheral  02-21 @ 13:15   No growth to date.  --  --      .Blood Blood-Peripheral  02-21 @ 13:10   No growth to date.  --  --          RADIOLOGY:< from: CT Abdomen and Pelvis No Cont (02.22.23 @ 09:33) >    ACC: [id number] EXAM:  CT CHEST   ORDERED BY: VALARIE NOBLE     ACC: [id number] EXAM:  CT ABDOMEN AND PELVIS   ORDERED BY: VALARIE NOBLE     PROCEDURE DATE:  02/22/2023          INTERPRETATION:  CLINICAL INFORMATION: Sepsis    COMPARISON:None.    CONTRAST/COMPLICATIONS:  IV Contrast: NONE  Oral Contrast: NONE  Complications: None reported at time of study completion    PROCEDURE:  CT of the Chest, Abdomen and Pelvis was performed.  Sagittal and coronal reformats were performed.    FINDINGS:    Please note that evaluation of the chest/abdominal organs and vascular   structures is limited by lack of intravenous contrast.    CHEST:  LUNGS AND LARGE AIRWAYS: Patent central airways. Clear lungs.  PLEURA: No pleural effusion.  VESSELS:Within normal limits.  HEART: Heart size is normal. No pericardial effusion.  MEDIASTINUM AND ADRIANA: No lymphadenopathy.  CHEST WALL AND LOWER NECK: Within normal limits.    ABDOMEN AND PELVIS:  LIVER: Within normal limits.  BILE DUCTS: Normal caliber.  GALLBLADDER: Within normal limits.  SPLEEN: Within normal limits.  PANCREAS: Within normal limits.  ADRENALS: Within normal limits.  KIDNEYS/URETERS: Bilateral punctate nonobstructive intrarenal calculi   measuring up to 1 mm. No hydronephrosis or ureteral calculi.    BLADDER: Within normal limits.  REPRODUCTIVE ORGANS: Prostate within normal limits.    BOWEL: No bowel obstruction. Appendix is within normal limits.  PERITONEUM: Trace pelvic fluid.  VESSELS: Within normal limits.  RETROPERITONEUM/LYMPH NODES: No lymphadenopathy.  ABDOMINAL WALL: Within normal limits.  BONES: Within normal limits.    IMPRESSION:  Clear lungs.  Bilateral nonobstructive nephrolithiasis.  No hydronephrosis or ureteral calculi.        --- End of Report ---            HAYDEE BOWEN MD; Attending Radiologist  This document has been electronically signed. Feb 22 2023  9:53AM    < end of copied text >  -------------------------------------------------------------------------------------------------------------------------------------------------------------------------------------------------------------------------------  ACC: [id number] EXAM:  XR CHEST PORTABLE IMMED 1V   ORDERED BY: DARRIN GREENFIELD     PROCEDURE DATE:  02/21/2023          INTERPRETATION:  Clinical information: Fever and rigors.    TECHNIQUE: Frontal view of the chest.    COMPARISON: None available.    FINDINGS: The lungs are clear. The cardiomediastinal silhouette is   normal. The visualized osseous structures are unremarkable.    IMPRESSION: Clear lungs.    --- End of Report ---              < end of copied text >        ROS  [  ] UNABLE TO ELICIT               HPI:  27M with no PMHx, p/w fatigue, fever, and NBNB emesis x4 since 4AM today. He reports subjective fevers and rigors that woke him up from sleep, denies night sweats. Also reports throat pain worse with swallowing cold liquids that was proceeded by a throat discomfort and sinus congestion a few days earlier. Denies productive cough. He denies abdominal pain, nausea, and diarrhea and his last BM was yesterday. Denies any change in appetite stating that he eats as he normally would. He does not have urinary symptoms or shortness of breath. He has not had any sick contacts. He denies tobacco and substance use. He reports having the flu vaccine but not vaccinated again COVID-19. Patient denies HA, chest pain, SOB, abdominal pain, and or changes in urination and bm. (21 Feb 2023 23:10)        History as above, asked to see this patient who was admitted with some nausea , vomiting and low grade  fevers along with some coughing, he had a sore throat also, all his symptoms have resolved now, he has no more fevers, no chills, no diarrhea, no urinary symptoms, no sore throat or other complaints at this time. His blood cultures are negative, his CXR is negative  as his CT of abdomen and pelvis. He is currently eating a solid diet and tolerating it.        PAST MEDICAL & SURGICAL HISTORY:  No pertinent past medical history      No significant past surgical history          No Known Allergies      Meds:  acetaminophen     Tablet .. 650 milliGRAM(s) Oral every 6 hours PRN  aluminum hydroxide/magnesium hydroxide/simethicone Suspension 30 milliLiter(s) Oral every 4 hours PRN  azithromycin  IVPB 500 milliGRAM(s) IV Intermittent every 24 hours  cefTRIAXone   IVPB 1000 milliGRAM(s) IV Intermittent every 24 hours  enoxaparin Injectable 40 milliGRAM(s) SubCutaneous every 24 hours  lactated ringers. 1000 milliLiter(s) IV Continuous <Continuous>  melatonin 3 milliGRAM(s) Oral at bedtime PRN  ondansetron Injectable 4 milliGRAM(s) IV Push every 8 hours PRN      SOCIAL HISTORY:  Smoker:  no  ETOH use:  denies  Ilicit Drug use: NO      FAMILY HISTORY: not contributory      VITALS:  Vital Signs Last 24 Hrs  T(C): 36.4 (23 Feb 2023 13:00), Max: 36.4 (22 Feb 2023 20:30)  T(F): 97.5 (23 Feb 2023 13:00), Max: 97.6 (22 Feb 2023 20:30)  HR: 56 (23 Feb 2023 13:00) (56 - 68)  BP: 110/60 (23 Feb 2023 13:00) (108/58 - 130/61)  BP(mean): --  RR: 18 (23 Feb 2023 13:00) (16 - 18)  SpO2: 100% (23 Feb 2023 13:00) (98% - 100%)    Parameters below as of 23 Feb 2023 13:00  Patient On (Oxygen Delivery Method): room air        LABS/DIAGNOSTIC TESTS:                          13.4   5.52  )-----------( 134      ( 23 Feb 2023 07:09 )             39.8     WBC Count: 5.52 K/uL (02-23 @ 07:09)  WBC Count: 5.19 K/uL (02-22 @ 05:55)  WBC Count: 11.52 K/uL (02-21 @ 13:10)      02-23    141  |  112<H>  |  10  ----------------------------<  81  4.5   |  26  |  0.79    Ca    9.0      23 Feb 2023 07:09  Phos  3.3     02-23  Mg     2.0     02-23    TPro  5.7<L>  /  Alb  2.9<L>  /  TBili  0.4  /  DBili  x   /  AST  14  /  ALT  18  /  AlkPhos  52  02-23          LIVER FUNCTIONS - ( 23 Feb 2023 07:09 )  Alb: 2.9 g/dL / Pro: 5.7 g/dL / ALK PHOS: 52 U/L / ALT: 18 U/L DA / AST: 14 U/L / GGT: x                 LACTATE:    ABG -     CULTURES:   .Blood Blood-Peripheral  02-21 @ 13:15   No growth to date.  --  --      .Blood Blood-Peripheral  02-21 @ 13:10   No growth to date.  --  --          RADIOLOGY:< from: CT Abdomen and Pelvis No Cont (02.22.23 @ 09:33) >    ACC: [id number] EXAM:  CT CHEST   ORDERED BY: VALARIE NOBLE     ACC: [id number] EXAM:  CT ABDOMEN AND PELVIS   ORDERED BY: VALARIE NOBLE     PROCEDURE DATE:  02/22/2023          INTERPRETATION:  CLINICAL INFORMATION: Sepsis    COMPARISON:None.    CONTRAST/COMPLICATIONS:  IV Contrast: NONE  Oral Contrast: NONE  Complications: None reported at time of study completion    PROCEDURE:  CT of the Chest, Abdomen and Pelvis was performed.  Sagittal and coronal reformats were performed.    FINDINGS:    Please note that evaluation of the chest/abdominal organs and vascular   structures is limited by lack of intravenous contrast.    CHEST:  LUNGS AND LARGE AIRWAYS: Patent central airways. Clear lungs.  PLEURA: No pleural effusion.  VESSELS:Within normal limits.  HEART: Heart size is normal. No pericardial effusion.  MEDIASTINUM AND ADRIANA: No lymphadenopathy.  CHEST WALL AND LOWER NECK: Within normal limits.    ABDOMEN AND PELVIS:  LIVER: Within normal limits.  BILE DUCTS: Normal caliber.  GALLBLADDER: Within normal limits.  SPLEEN: Within normal limits.  PANCREAS: Within normal limits.  ADRENALS: Within normal limits.  KIDNEYS/URETERS: Bilateral punctate nonobstructive intrarenal calculi   measuring up to 1 mm. No hydronephrosis or ureteral calculi.    BLADDER: Within normal limits.  REPRODUCTIVE ORGANS: Prostate within normal limits.    BOWEL: No bowel obstruction. Appendix is within normal limits.  PERITONEUM: Trace pelvic fluid.  VESSELS: Within normal limits.  RETROPERITONEUM/LYMPH NODES: No lymphadenopathy.  ABDOMINAL WALL: Within normal limits.  BONES: Within normal limits.    IMPRESSION:  Clear lungs.  Bilateral nonobstructive nephrolithiasis.  No hydronephrosis or ureteral calculi.        --- End of Report ---            HAYDEE BOWEN MD; Attending Radiologist  This document has been electronically signed. Feb 22 2023  9:53AM    < end of copied text >  -------------------------------------------------------------------------------------------------------------------------------------------------------------------------------------------------------------------------------  ACC: [id number] EXAM:  XR CHEST PORTABLE IMMED 1V   ORDERED BY: DARRIN GREENFIELD     PROCEDURE DATE:  02/21/2023          INTERPRETATION:  Clinical information: Fever and rigors.    TECHNIQUE: Frontal view of the chest.    COMPARISON: None available.    FINDINGS: The lungs are clear. The cardiomediastinal silhouette is   normal. The visualized osseous structures are unremarkable.    IMPRESSION: Clear lungs.    --- End of Report ---              < end of copied text >        ROS  [  ] UNABLE TO ELICIT

## 2023-02-23 NOTE — DISCHARGE NOTE NURSING/CASE MANAGEMENT/SOCIAL WORK - NSDCVIVACCINE_GEN_ALL_CORE_FT
Tdap; 15-Oct-2016 22:36; Ting Jeff (RN); [employer]; [id number]; IntraMuscular; Deltoid Left.; 0.5 milliLiter(s); VIS (VIS Published: 09-May-2013, VIS Presented: 15-Oct-2016);

## 2023-02-23 NOTE — DISCHARGE NOTE NURSING/CASE MANAGEMENT/SOCIAL WORK - PATIENT PORTAL LINK FT
You can access the FollowMyHealth Patient Portal offered by [facility] by registering at the following website: [email or web address]. By joining [employer]’s FollowMyHealth portal, you will also be able to view your health information using other applications (apps) compatible with our system.

## 2023-02-23 NOTE — DISCHARGE NOTE PROVIDER - NSDCCAREPROVSEEN_GEN_ALL_CORE_FT
Brissa Silva # FEVER, INTRACTABLE N/V - S/T VIRAL GASTROENTERITIS  - ID CONSULT    # NONOBSTRUCTIVE NEPHROLITHIASIS  - D/W PATIENT TO F/U WITH OUTPATIENT UROLOGIST    Brissa Silva

## 2023-02-23 NOTE — DISCHARGE NOTE PROVIDER - HOSPITAL COURSE
27M with no PMHx, p/w fatigue, fever, and NBNB emesis x4 since 4AM today. He reports subjective fevers and rigors that woke him up from sleep, denies night sweats. Also reports throat pain worse with swallowing cold liquids that was proceeded by a throat discomfort and sinus congestion a few days earlier. Denies productive cough. He denies abdominal pain, nausea, and diarrhea and his last BM was yesterday. Denies any change in appetite stating that he eats as he normally would. He does not have urinary symptoms or shortness of breath. He has not had any sick contacts. He denies tobacco and substance use. He reports having the flu vaccine but not vaccinated again COVID-19. Patient denies HA, chest pain, SOB, abdominal pain, and or changes in urination and bm.    THIS IS A BRIEF SUMMARY.  FOR A FULL HOSPITAL COURSE SEE MEDICAL RECORDS OR [facility] PORTAL.

## 2023-02-23 NOTE — PROGRESS NOTE ADULT - ASSESSMENT
27 year old, Male, from home, with no PMH.  Presented to the ED w/ fatigue, fever, and NBNB emesis x4 . Admitted for SIRS and further work up and found to have bilateral nonobstructive nephrolithiasis. 
27M with no PMHx, p/w fatigue, fever, and NBNB emesis x4 . Admitted for SIRS and further work up. In ed, HR 96, WBC11.52, Lactate, covid, rvp and UA negative. ct chest/a/p- Clear lungs. Bilateral nonobstructive nephrolithiasis. No hydronephrosis or ureteral calculi.  blood and urine cultures drawn, pt started on abx and IVF. ID Dr Hall consulted.

## 2023-02-23 NOTE — DISCHARGE NOTE PROVIDER - CARE PROVIDER_API CALL
Brissa Silva)  Internal Medicine  [phone] [place] [address]  Phone: [phone]  Fax: [phone]  Follow Up Time:

## 2023-02-23 NOTE — PROGRESS NOTE ADULT - SUBJECTIVE AND OBJECTIVE BOX
NP Note discussed with  primary attending    Patient is a 27y old  Male who presents with a chief complaint of Weakness, Fevers (2023 14:14)      INTERVAL HPI/OVERNIGHT EVENTS: seen with     MEDICATIONS  (STANDING):  azithromycin  IVPB 500 milliGRAM(s) IV Intermittent every 24 hours  cefTRIAXone   IVPB 1000 milliGRAM(s) IV Intermittent every 24 hours  enoxaparin Injectable 40 milliGRAM(s) SubCutaneous every 24 hours  lactated ringers. 1000 milliLiter(s) (100 mL/Hr) IV Continuous <Continuous>    MEDICATIONS  (PRN):  acetaminophen     Tablet .. 650 milliGRAM(s) Oral every 6 hours PRN Temp greater or equal to 38C (100.4F), Mild Pain (1 - 3)  aluminum hydroxide/magnesium hydroxide/simethicone Suspension 30 milliLiter(s) Oral every 4 hours PRN Dyspepsia  melatonin 3 milliGRAM(s) Oral at bedtime PRN Insomnia  ondansetron Injectable 4 milliGRAM(s) IV Push every 8 hours PRN Nausea and/or Vomiting      __________________________________________________  REVIEW OF SYSTEMS:    CONSTITUTIONAL: No fever  EYES: No acute visual disturbances  NECK: No pain or stiffness  RESPIRATORY: No cough; No shortness of breath  CARDIOVASCULAR: No chest pain, no palpitations  GASTROINTESTINAL: No pain. No nausea or vomiting.  No diarrhea   NEUROLOGICAL: No headache or numbness, no tremors  MUSCULOSKELETAL: No joint pain, no muscle pain  GENITOURINARY: No dysuria, no frequency, no hesitancy  PSYCHIATRY: No depression , no anxiety  ALL OTHER  ROS negative        Vital Signs Last 24 Hrs  T(C): 36.4 (2023 04:48), Max: 36.7 (2023 16:19)  T(F): 97.6 (2023 04:48), Max: 98 (2023 16:19)  HR: 59 (2023 04:48) (57 - 68)  BP: 109/57 (2023 04:48) (108/58 - 130/61)  BP(mean): --  RR: 16 (2023 04:48) (16 - 18)  SpO2: 100% (2023 04:48) (98% - 100%)    Parameters below as of 2023 04:48  Patient On (Oxygen Delivery Method): room air        ________________________________________________  PHYSICAL EXAM:  GENERAL: NAD  HEENT: Normocephalic;  conjunctivae and sclerae clear; moist mucous membranes   NECK : Supple  CHEST/LUNG: Clear to auscultation bilaterally with good air entry   HEART: S1 S2  regular; no murmurs, gallops or rubs  ABDOMEN: Soft, Nontender, Nondistended; Bowel sounds present x 4 quad  EXTREMITIES: No cyanosis; no edema; no calf tenderness  SKIN: Warm and dry; no rash  NERVOUS SYSTEM:  Awake and alert; Oriented to place, person and time; no new deficits    _________________________________________________  LABS:                        13.4   5.52  )-----------( 134      ( 2023 07:09 )             39.8     02-    141  |  112<H>  |  10  ----------------------------<  81  4.5   |  26  |  0.79    Ca    9.0      2023 07:09  Phos  3.3     -  Mg     2.0         TPro  5.7<L>  /  Alb  2.9<L>  /  TBili  0.4  /  DBili  x   /  AST  14  /  ALT  18  /  AlkPhos  52        Urinalysis Basic - ( 2023 16:20 )    Color: Yellow / Appearance: Clear / S.020 / pH: x  Gluc: x / Ketone: Trace  / Bili: Small / Urobili: 8   Blood: x / Protein: 30 mg/dL / Nitrite: Negative   Leuk Esterase: Trace / RBC: 2-5 /HPF / WBC 3-5 /HPF   Sq Epi: x / Non Sq Epi: Few /HPF / Bacteria: Moderate /HPF      CAPILLARY BLOOD GLUCOSE            RADIOLOGY & ADDITIONAL TESTS:    Imaging Personally Reviewed:  YES/NO    Consultant(s) Notes Reviewed:   YES/ No    Care Discussed with Consultants :     Plan of care was discussed with patient and /or primary care giver; all questions and concerns were addressed and care was aligned with patient's wishes.     NP Note discussed with  primary attending    Patient is a 27y old  Male who presents with a chief complaint of Weakness, Fevers (2023 14:14)      INTERVAL HPI/OVERNIGHT EVENTS: Pt reports to "feel better", able to eat today w/o emesis. Denies fever.  Denies nausea or abdominal pain at this time.  Last BM 3 days ago.  Urinating w/o difficulty.  Seen w/ mother at bedside.    MEDICATIONS  (STANDING):  azithromycin  IVPB 500 milliGRAM(s) IV Intermittent every 24 hours  cefTRIAXone   IVPB 1000 milliGRAM(s) IV Intermittent every 24 hours  enoxaparin Injectable 40 milliGRAM(s) SubCutaneous every 24 hours  lactated ringers. 1000 milliLiter(s) (100 mL/Hr) IV Continuous <Continuous>    MEDICATIONS  (PRN):  acetaminophen     Tablet .. 650 milliGRAM(s) Oral every 6 hours PRN Temp greater or equal to 38C (100.4F), Mild Pain (1 - 3)  aluminum hydroxide/magnesium hydroxide/simethicone Suspension 30 milliLiter(s) Oral every 4 hours PRN Dyspepsia  melatonin 3 milliGRAM(s) Oral at bedtime PRN Insomnia  ondansetron Injectable 4 milliGRAM(s) IV Push every 8 hours PRN Nausea and/or Vomiting      __________________________________________________  REVIEW OF SYSTEMS:    CONSTITUTIONAL: No fever  EYES: No acute visual disturbances  NECK: No pain or stiffness  RESPIRATORY: No cough; No shortness of breath  CARDIOVASCULAR: No chest pain, no palpitations  GASTROINTESTINAL: No pain. No nausea or vomiting.  No diarrhea   NEUROLOGICAL: No headache or numbness, no tremors  MUSCULOSKELETAL: No joint pain, no muscle pain  GENITOURINARY: No dysuria, no frequency, no hesitancy  PSYCHIATRY: No depression , no anxiety  ALL OTHER  ROS negative        Vital Signs Last 24 Hrs  T(C): 36.4 (2023 04:48), Max: 36.7 (2023 16:19)  T(F): 97.6 (2023 04:48), Max: 98 (2023 16:19)  HR: 59 (2023 04:48) (57 - 68)  BP: 109/57 (2023 04:48) (108/58 - 130/61)  RR: 16 (2023 04:48) (16 - 18)  SpO2: 100% (2023 04:48) (98% - 100%)    Parameters below as of 2023 04:48  Patient On (Oxygen Delivery Method): room air        ________________________________________________  PHYSICAL EXAM:  GENERAL: NAD  HEENT: Normocephalic;  conjunctivae and sclerae clear; moist mucous membranes   NECK : Supple  CHEST/LUNG: Clear to auscultation bilaterally with good air entry   HEART: S1 S2  regular; no murmurs, gallops or rubs  ABDOMEN: Soft, Nontender, Nondistended; Bowel sounds present x 4 quad.  No rebound or guarding.    EXTREMITIES: No cyanosis; no edema; no calf tenderness  SKIN: Warm and dry; no rash  NERVOUS SYSTEM:  Awake and alert; Oriented to place, person and time; no new deficits    _________________________________________________  LABS:                        13.4   5.52  )-----------( 134      ( 2023 07:09 )             39.8     02-23    141  |  112<H>  |  10  ----------------------------<  81  4.5   |  26  |  0.79    Ca    9.0      2023 07:09  Phos  3.3     02-23  Mg     2.0     02-23    TPro  5.7<L>  /  Alb  2.9<L>  /  TBili  0.4  /  DBili  x   /  AST  14  /  ALT  18  /  AlkPhos  52  02-23      Urinalysis Basic - ( 2023 16:20 )    Color: Yellow / Appearance: Clear / S.020 / pH: x  Gluc: x / Ketone: Trace  / Bili: Small / Urobili: 8   Blood: x / Protein: 30 mg/dL / Nitrite: Negative   Leuk Esterase: Trace / RBC: 2-5 /HPF / WBC 3-5 /HPF   Sq Epi: x / Non Sq Epi: Few /HPF / Bacteria: Moderate /HPF      CAPILLARY BLOOD GLUCOSE            RADIOLOGY & ADDITIONAL TESTS:    Imaging Personally Reviewed:  YES/NO    Consultant(s) Notes Reviewed:   YES/ No    Care Discussed with Consultants :     Plan of care was discussed with patient and /or primary care giver; all questions and concerns were addressed and care was aligned with patient's wishes.

## 2023-02-23 NOTE — CONSULT NOTE ADULT - ASSESSMENT
Fevers - resolved  Viral Gastroenteritis - resolved      Plan - Can DC home today on ceftin 250mgs po BID x 5 days.  Discussed with Dr Silva and Conner CALVILLO.  reconsult prn.

## 2023-02-23 NOTE — DISCHARGE NOTE PROVIDER - NSDCCPCAREPLAN_GEN_ALL_CORE_FT
PRINCIPAL DISCHARGE DIAGNOSIS  Diagnosis: Viral gastroenteritis  Assessment and Plan of Treatment: You presented to the hospital with a viral syndrome.  Your chest xray was negative.    COVID and Flu negative; UA negative    UA negative   Started on Rocephin and Azithromycin    While in the hospital you received supportive care  Your CT A/P showed kidney stones.  Please continue and complete Ceftin as prescribed         SECONDARY DISCHARGE DIAGNOSES  Diagnosis: Systemic inflammatory response syndrome (SIRS) without organ dysfunction  Assessment and Plan of Treatment: You presented to the hospital with a viral syndrome.  Your chest xray was negative.    COVID and Flu negative; UA negative   UA negative   Started on Rocephin and Azithromycin    While in the hospital you received supportive care  Your CT A/P showed kidney stones.  Please continue and complete Ceftin as prescribed

## 2023-02-23 NOTE — PROGRESS NOTE ADULT - PROBLEM SELECTOR PROBLEM 1
Systemic inflammatory response syndrome (SIRS) without organ dysfunction
Systemic inflammatory response syndrome (SIRS) without organ dysfunction

## 2023-02-23 NOTE — PROGRESS NOTE ADULT - PROBLEM SELECTOR PLAN 1
p/w subjective fevers and rigors  meets SIRS criteria: elevated HR and temp  Likely 2/2 acute viral illness  WBC 11K--> 5K  CXR negative, COVID and Flu negative; UA negative   s/p azithromycin, rocephin, 2.3 L NS bolus in the ED   CT chest a/p- clear lungs b/l nonobstructive NEPHROLITHIASIS  c/w Rocephin and azithromycin    c/w zofran PRN   f/u UCx and BCx  ID Dr. Hall consulted
- P/w subjective fevers and rigors.  met SIRS criteria: elevated HR and temp  - Possibly 2/2 acute viral illness  - CXR negative, COVID and Flu negative; UA negative.  UCx and BCx   - S/p Azithromycin, Rocephin, 2.3 L NS bolus in the ED   - S/p CT chest a/p-clear lungs b/l nonobstructive NEPHROLITHIASIS  - C/w Rocephin and azithromycin    - ID-Dr. Hall consulted

## 2023-02-26 LAB
CULTURE RESULTS: SIGNIFICANT CHANGE UP
CULTURE RESULTS: SIGNIFICANT CHANGE UP
SPECIMEN SOURCE: SIGNIFICANT CHANGE UP
SPECIMEN SOURCE: SIGNIFICANT CHANGE UP

## 2023-05-12 NOTE — DISCHARGE NOTE PROVIDER - DISCHARGE SERVICE FOR PATIENT
Body Location Override (Optional - Billing Will Still Be Based On Selected Body Map Location If Applicable): right nasal ala on the discharge service for the patient. I have reviewed and made amendments to the documentation where necessary.

## 2023-05-30 NOTE — ED ADULT NURSE NOTE - CHIEF COMPLAINT QUOTE
Patient
BIBA  for c/o   fever  and  vomiting x  5  x  this  am   took  tylenol   for  fever  @  6 AM today

## 2024-10-28 ENCOUNTER — EMERGENCY (EMERGENCY)
Facility: HOSPITAL | Age: 29
LOS: 1 days | Discharge: HOME | End: 2024-10-28
Attending: STUDENT IN AN ORGANIZED HEALTH CARE EDUCATION/TRAINING PROGRAM
Payer: MEDICAID

## 2024-10-28 VITALS
RESPIRATION RATE: 18 BRPM | WEIGHT: 165.35 LBS | DIASTOLIC BLOOD PRESSURE: 60 MMHG | SYSTOLIC BLOOD PRESSURE: 107 MMHG | TEMPERATURE: 98 F | OXYGEN SATURATION: 98 % | HEART RATE: 74 BPM

## 2024-10-28 PROCEDURE — 73630 X-RAY EXAM OF FOOT: CPT

## 2024-10-28 PROCEDURE — 73610 X-RAY EXAM OF ANKLE: CPT

## 2024-10-28 PROCEDURE — 99284 EMERGENCY DEPT VISIT MOD MDM: CPT | Mod: 25

## 2024-10-28 PROCEDURE — 99284 EMERGENCY DEPT VISIT MOD MDM: CPT

## 2024-10-28 PROCEDURE — 73630 X-RAY EXAM OF FOOT: CPT | Mod: 26,RT

## 2024-10-28 PROCEDURE — 73610 X-RAY EXAM OF ANKLE: CPT | Mod: 26,RT

## 2024-10-28 NOTE — ED PROVIDER NOTE - PATIENT PORTAL LINK FT
You can access the FollowMyHealth Patient Portal offered by [facility] by registering at the following website: [email or web address]. By joining [facility]’s FollowMyHealth portal, you will also be able to view your health information using other applications (apps) compatible with our system.

## 2024-10-28 NOTE — ED PROVIDER NOTE - CLINICAL SUMMARY MEDICAL DECISION MAKING FREE TEXT BOX
28-year-old male with no past medical history presents with right ankle pain for 1 week.  Patient reports that he was running down the stairs 1 week ago when he laterally twisted his right ankle.  States he is taken ibuprofen for symptoms.  Is able to ambulate, but with pain.    Will obtain xray to r/o fracture. Medications for pain.

## 2024-10-28 NOTE — ED PROVIDER NOTE - NSFOLLOWUPINSTRUCTIONS_ED_ALL_ED_FT
Follow up with the podiatrist if pain persists for longer than 2 weeks.     [facility]  [facility]iatry/Wound [facility]  [address] [place] [address]  Phone: [phone]    You can take Motrin (ibuprofen) 600 mg every 6 hours or Tylenol (acetaminophen) 1000 mg every 6 hours as needed for pain or fever.     If you experience any new or worsening symptoms or if you are concerned you can always come back to the emergency for a re-evaluation.

## 2024-10-28 NOTE — ED PROVIDER NOTE - PHYSICAL EXAMINATION
Right ankle - + swelling and ecchymosis. +TTP of lateral malleolus. No deformity, lacerations, abrasions, ulcers or sensory deficits. Popliteal, dorsalis pedis and posterior tibial pulses equally strong 4+ bilaterally. Capillary refill in lower extremity < 2 seconds. Full range of motion during dorsiflexion, plantar flexion, eversion and inversion. Extensor hallucis longus flexion and extension intact. DP and PT pulses 2+ bilaterally.

## 2024-10-28 NOTE — ED ADULT NURSE NOTE - NSFALLUNIVINTERV_ED_ALL_ED
Bed/Stretcher in lowest position, wheels locked, appropriate side rails in place/Call bell, personal items and telephone in reach/Instruct patient to call for assistance before getting out of bed/chair/stretcher/Non-slip footwear applied when patient is off stretcher/[place] to call system/Physically safe environment - no spills, clutter or unnecessary equipment/Purposeful proactive rounding/Room/bathroom lighting operational, light cord in reach

## 2024-10-28 NOTE — ED PROVIDER NOTE - OBJECTIVE STATEMENT
28-year-old male with no past medical history presents with right ankle pain for 1 week.  Patient reports that he was running down the stairs 1 week ago when he laterally twisted his right ankle.  States he is taken ibuprofen for symptoms.  Is able to ambulate, but with pain.